# Patient Record
Sex: FEMALE | Race: BLACK OR AFRICAN AMERICAN | URBAN - METROPOLITAN AREA
[De-identification: names, ages, dates, MRNs, and addresses within clinical notes are randomized per-mention and may not be internally consistent; named-entity substitution may affect disease eponyms.]

---
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
## 2024-06-21 ENCOUNTER — OFFICE VISIT (OUTPATIENT)
Dept: RURAL CLINIC 2 | Facility: CLINIC | Age: 19
End: 2024-06-21
Payer: COMMERCIAL

## 2024-06-21 ENCOUNTER — LAB OUTSIDE AN ENCOUNTER (OUTPATIENT)
Dept: RURAL CLINIC 2 | Facility: CLINIC | Age: 19
End: 2024-06-21

## 2024-06-21 VITALS
DIASTOLIC BLOOD PRESSURE: 99 MMHG | SYSTOLIC BLOOD PRESSURE: 138 MMHG | WEIGHT: 145 LBS | HEIGHT: 62 IN | BODY MASS INDEX: 26.68 KG/M2 | HEART RATE: 119 BPM | TEMPERATURE: 97.1 F

## 2024-06-21 DIAGNOSIS — Z83.79 FAMILY HISTORY OF CROHN'S DISEASE: ICD-10-CM

## 2024-06-21 DIAGNOSIS — R74.8 ELEVATED ALKALINE PHOSPHATASE LEVEL: ICD-10-CM

## 2024-06-21 DIAGNOSIS — D72.829 LEUKOCYTOSIS, UNSPECIFIED: ICD-10-CM

## 2024-06-21 DIAGNOSIS — R93.3 ABNORMAL COMPUTED TOMOGRAPHY OF CECUM AND TERMINAL ILEUM: ICD-10-CM

## 2024-06-21 PROBLEM — 15634181000119107: Status: ACTIVE | Noted: 2024-06-21

## 2024-06-21 PROBLEM — 160386006: Status: ACTIVE | Noted: 2024-06-21

## 2024-06-21 PROBLEM — 111583006: Status: ACTIVE | Noted: 2024-06-21

## 2024-06-21 PROCEDURE — 99214 OFFICE O/P EST MOD 30 MIN: CPT | Performed by: NURSE PRACTITIONER

## 2024-06-21 RX ORDER — SODIUM, POTASSIUM,MAG SULFATES 17.5-3.13G
354 ML SOLUTION, RECONSTITUTED, ORAL ORAL 2
Qty: 1 | Refills: 0 | OUTPATIENT
Start: 2024-06-21 | End: 2024-06-22

## 2024-06-21 RX ORDER — OMEPRAZOLE 40 MG/1
1 CAPSULE 30 MINUTES BEFORE MORNING MEAL CAPSULE, DELAYED RELEASE ORAL ONCE A DAY
Qty: 90 | Refills: 3 | Status: DISCONTINUED | COMMUNITY

## 2024-06-21 NOTE — HPI-ZZZTODAY'S VISIT
06/21/2024 The patient is following up from recent ER visit at [facility] on June 2 where she was seen for epigastric abdominal pain nausea and vomiting thought to be secondary to gastroenteritis and responded well to Zofran.  CT scan of the abdomen and pelvis completed with incidental findings of a 5.9 x 2.6 cm dilated tubular structure in the right lower quadrant which appears to be arise from the cecum.  This may represent an appendiceal mucocele although certainly a mucinous neoplasm could give this appearance.  The patient denies a change in bowels, right lower quadrant abdominal pain or rectal bleeding. Lab work reveals an elevated alkaline phosphatase level of 150 which was also elevated a year ago at 161.  White blood count was also elevated at 14.8. She is no longer nausea, vomiting or epigastric abdominal pain and is tolerating a regular diet.  Family history is significant for Crohn's disease in her brother and father.

## 2024-06-21 NOTE — HPI-TODAY'S VISIT:
Patient presents with her mother and brother (also a patient of mine) as a referral from Dr. Christiano Blair for evaluation of abdominal pain. A copy of this note will be sent to the referring provider.  The patient mainly complains of an intermittent epigastric/RUQ pain that is worsened when she eats spicy foods. Her father and brother have Crohn's disease. She was in the [facility] ED on 4/28/23 for evaluation of "abdominal spasms" - records reviewed. She had experienced a 3-day history of abdominal pains that sometimes affect her whole body. She had nausea/vomiting whenever she tried to eat. She had been in the ED 2 days prior for the same symptoms - her CT was normal, and the only lab abnormality was low potassium. However, repeat labs done on 4/28/23 showed a bilirubin of 1.12 with alkaline phosphatase 142, both slightly elevated. Ultrasound was ordered - it was normal without any gallbladder stones or sludge. She was discharged on dicyclomine/ondansetron for presumed viral illness with an anxiety component. She feels well today. She states that she has chronic iron deficiency anemia - her hemoglobin was 8.9 g/dL (MCV 66) on 4/28/23. She has not seen any blood in her stool. Menstrual periods are reportedly normal.

## 2024-06-24 ENCOUNTER — TELEPHONE ENCOUNTER (OUTPATIENT)
Dept: RURAL CLINIC 2 | Facility: CLINIC | Age: 19
End: 2024-06-24

## 2024-07-01 ENCOUNTER — TELEPHONE ENCOUNTER (OUTPATIENT)
Dept: RURAL CLINIC 2 | Facility: CLINIC | Age: 19
End: 2024-07-01

## 2024-07-08 ENCOUNTER — CLAIMS CREATED FROM THE CLAIM WINDOW (OUTPATIENT)
Dept: URBAN - METROPOLITAN AREA SURGERY CENTER 14 | Facility: SURGERY CENTER | Age: 19
End: 2024-07-08
Payer: COMMERCIAL

## 2024-07-08 DIAGNOSIS — K56.699 ILEAL STENOSIS: ICD-10-CM

## 2024-07-08 DIAGNOSIS — K50.00 TERMINAL ILEITIS WITHOUT COMPLICATION: ICD-10-CM

## 2024-07-08 DIAGNOSIS — K52.9 ILEITIS: ICD-10-CM

## 2024-07-08 DIAGNOSIS — Z83.79 FAMILY HISTORY OF CROHN'S DISEASE: ICD-10-CM

## 2024-07-08 DIAGNOSIS — R93.3 ABNORMAL CT SCAN, GASTROINTESTINAL TRACT: ICD-10-CM

## 2024-07-08 DIAGNOSIS — K63.3 ILEUM ULCER: ICD-10-CM

## 2024-07-08 PROCEDURE — 45380 COLONOSCOPY AND BIOPSY: CPT | Performed by: INTERNAL MEDICINE

## 2024-07-08 PROCEDURE — 00811 ANES LWR INTST NDSC NOS: CPT | Performed by: NURSE ANESTHETIST, CERTIFIED REGISTERED

## 2024-07-16 ENCOUNTER — TELEPHONE ENCOUNTER (OUTPATIENT)
Dept: URBAN - METROPOLITAN AREA CLINIC 105 | Facility: CLINIC | Age: 19
End: 2024-07-16

## 2024-07-18 ENCOUNTER — TELEPHONE ENCOUNTER (OUTPATIENT)
Dept: RURAL CLINIC 2 | Facility: CLINIC | Age: 19
End: 2024-07-18

## 2024-07-30 ENCOUNTER — OFFICE VISIT (OUTPATIENT)
Dept: RURAL CLINIC 2 | Facility: CLINIC | Age: 19
End: 2024-07-30

## 2024-08-06 ENCOUNTER — OFFICE VISIT (OUTPATIENT)
Dept: RURAL CLINIC 2 | Facility: CLINIC | Age: 19
End: 2024-08-06
Payer: COMMERCIAL

## 2024-08-06 VITALS
SYSTOLIC BLOOD PRESSURE: 92 MMHG | TEMPERATURE: 97.5 F | HEART RATE: 92 BPM | HEIGHT: 62 IN | DIASTOLIC BLOOD PRESSURE: 60 MMHG | BODY MASS INDEX: 27.02 KG/M2 | WEIGHT: 146.8 LBS

## 2024-08-06 DIAGNOSIS — K62.5 RECTAL BLEEDING: ICD-10-CM

## 2024-08-06 DIAGNOSIS — K50.012 CROHN'S DISEASE OF SMALL INTESTINE WITH INTESTINAL OBSTRUCTION: ICD-10-CM

## 2024-08-06 PROBLEM — 12063002: Status: ACTIVE | Noted: 2024-08-06

## 2024-08-06 PROBLEM — 56689002: Status: ACTIVE | Noted: 2024-08-06

## 2024-08-06 PROCEDURE — 99214 OFFICE O/P EST MOD 30 MIN: CPT | Performed by: NURSE PRACTITIONER

## 2024-08-06 RX ORDER — UPADACITINIB 45 MG/1
AS DIRECTED TABLET, EXTENDED RELEASE ORAL
Qty: 30 | Refills: 2 | OUTPATIENT
Start: 2024-08-06 | End: 2024-11-03

## 2024-08-06 NOTE — HPI-TODAY'S VISIT:
Patient presents with her mother and brother (also a patient of mine) as a referral from Dr. Christiano Blair for evaluation of abdominal pain. A copy of this note will be sent to the referring provider.  The patient mainly complains of an intermittent epigastric/RUQ pain that is worsened when she eats spicy foods. Her father and brother have Crohn's disease. She was in the [facility] ED on 4/28/23 for evaluation of "abdominal spasms" - records reviewed. She had experienced a 3-day history of abdominal pains that sometimes affect her whole body. She had nausea/vomiting whenever she tried to eat. She had been in the ED 2 days prior for the same symptoms - her CT was normal, and the only lab abnormality was low potassium. However, repeat labs done on 4/28/23 showed a bilirubin of 1.12 with alkaline phosphatase 142, both slightly elevated. Ultrasound was ordered - it was normal without any gallbladder stones or sludge. She was discharged on dicyclomine/ondansetron for presumed viral illness with an anxiety component. She feels well today. She states that she has chronic iron deficiency anemia - her hemoglobin was 8.9 g/dL (MCV 66) on 4/28/23. She has not seen any blood in her stool. Menstrual periods are reportedly normal.

## 2024-08-06 NOTE — HPI-ZZZTODAY'S VISIT
06/21/2024 The patient is following up from recent ER visit at [facility] on June 2 where she was seen for epigastric abdominal pain nausea and vomiting thought to be secondary to gastroenteritis and responded well to Zofran.  CT scan of the abdomen and pelvis completed with incidental findings of a 5.9 x 2.6 cm dilated tubular structure in the right lower quadrant which appears to be arise from the cecum.  This may represent an appendiceal mucocele although certainly a mucinous neoplasm could give this appearance.  The patient denies a change in bowels, right lower quadrant abdominal pain or rectal bleeding. Lab work reveals an elevated alkaline phosphatase level of 150 which was also elevated a year ago at 161.  White blood count was also elevated at 14.8. She is no longer nausea, vomiting or epigastric abdominal pain and is tolerating a regular diet.  Family history is significant for Crohn's disease in her brother and father. 08/06/2024 The patient is following up from a colonoscopy completed for abnormal CT scan of the GI tract and family history of Crohn's disease with findings of internal hemorrhoids, normal mucosa of the examined colon and ileitis.  Inflammation was found and this was severe.  Pathology of terminal ileum biopsies reveals active ileitis with ulceration. Patient referred to surgeon for right Hemicolectomy to include the appendix and terminal ileum.

## 2024-08-11 PROBLEM — 38106008: Status: ACTIVE | Noted: 2024-08-11

## 2024-08-13 ENCOUNTER — OFFICE VISIT (OUTPATIENT)
Dept: RURAL CLINIC 2 | Facility: CLINIC | Age: 19
End: 2024-08-13

## 2024-08-22 ENCOUNTER — TELEPHONE ENCOUNTER (OUTPATIENT)
Dept: RURAL CLINIC 2 | Facility: CLINIC | Age: 19
End: 2024-08-22

## 2024-08-22 ENCOUNTER — TELEPHONE ENCOUNTER (OUTPATIENT)
Dept: RURAL CLINIC 8 | Facility: CLINIC | Age: 19
End: 2024-08-22

## 2024-08-22 RX ORDER — ZOSTER VACCINE RECOMBINANT, ADJUVANTED 50 MCG/0.5
50 MCG/0.5ML SUSPENSION RECONSTITUTED KIT INTRAMUSCULAR ONCE
Qty: 1 | Refills: 1 | OUTPATIENT
Start: 2024-08-22 | End: 2024-08-24

## 2024-09-30 ENCOUNTER — TELEPHONE ENCOUNTER (OUTPATIENT)
Dept: RURAL CLINIC 2 | Facility: CLINIC | Age: 19
End: 2024-09-30

## 2024-10-02 ENCOUNTER — TELEPHONE ENCOUNTER (OUTPATIENT)
Dept: RURAL CLINIC 2 | Facility: CLINIC | Age: 19
End: 2024-10-02

## 2024-10-17 ENCOUNTER — TELEPHONE ENCOUNTER (OUTPATIENT)
Dept: RURAL CLINIC 2 | Facility: CLINIC | Age: 19
End: 2024-10-17

## 2024-10-18 ENCOUNTER — TELEPHONE ENCOUNTER (OUTPATIENT)
Dept: RURAL CLINIC 2 | Facility: CLINIC | Age: 19
End: 2024-10-18

## 2024-12-10 ENCOUNTER — OFFICE VISIT (OUTPATIENT)
Dept: RURAL CLINIC 2 | Facility: CLINIC | Age: 19
End: 2024-12-10

## 2024-12-10 PROBLEM — 428305005: Status: ACTIVE | Noted: 2024-12-10

## 2024-12-10 NOTE — PHYSICAL EXAM CHEST:
chest wall non-tender, breathing is unlabored without accessory muscle use, normal breath sounds F42.9

## 2024-12-10 NOTE — HPI-TODAY'S VISIT:
Patient presents with her mother and brother (also a patient of mine) as a referral from Dr. Christiano Blair for evaluation of abdominal pain. A copy of this note will be sent to the referring provider.  The patient mainly complains of an intermittent epigastric/RUQ pain that is worsened when she eats spicy foods. Her father and brother have Crohn's disease. She was in the [facility] ED on 4/28/23 for evaluation of "abdominal spasms" - records reviewed. She had experienced a 3-day history of abdominal pains that sometimes affect her whole body. She had nausea/vomiting whenever she tried to eat. She had been in the ED 2 days prior for the same symptoms - her CT was normal, and the only lab abnormality was low potassium. However, repeat labs done on 4/28/23 showed a bilirubin of 1.12 with alkaline phosphatase 142, both slightly elevated. Ultrasound was ordered - it was normal without any gallbladder stones or sludge. She was discharged on dicyclomine/ondansetron for presumed viral illness with an anxiety component. She feels well today. She states that she has chronic iron deficiency anemia - her hemoglobin was 8.9 g/dL (MCV 66) on 4/28/23. She has not seen any blood in her stool. Menstrual periods are reportedly normal.

## 2024-12-10 NOTE — HPI-ZZZTODAY'S VISIT
06/21/2024 The patient is following up from recent ER visit at [facility] on June 2 where she was seen for epigastric abdominal pain nausea and vomiting thought to be secondary to gastroenteritis and responded well to Zofran.  CT scan of the abdomen and pelvis completed with incidental findings of a 5.9 x 2.6 cm dilated tubular structure in the right lower quadrant which appears to be arise from the cecum.  This may represent an appendiceal mucocele although certainly a mucinous neoplasm could give this appearance.  The patient denies a change in bowels, right lower quadrant abdominal pain or rectal bleeding. Lab work reveals an elevated alkaline phosphatase level of 150 which was also elevated a year ago at 161.  White blood count was also elevated at 14.8. She is no longer nausea, vomiting or epigastric abdominal pain and is tolerating a regular diet.  Family history is significant for Crohn's disease in her brother and father. 08/06/2024 The patient is following up from a colonoscopy completed for abnormal CT scan of the GI tract and family history of Crohn's disease with findings of internal hemorrhoids, normal mucosa of the examined colon and ileitis.  Inflammation was found and this was severe.  Pathology of terminal ileum biopsies reveals active ileitis with ulceration. Patient referred to surgeon for right Hemicolectomy to include the appendix and terminal ileum.